# Patient Record
Sex: FEMALE | Race: WHITE | NOT HISPANIC OR LATINO | Employment: OTHER | URBAN - METROPOLITAN AREA
[De-identification: names, ages, dates, MRNs, and addresses within clinical notes are randomized per-mention and may not be internally consistent; named-entity substitution may affect disease eponyms.]

---

## 2018-05-31 ENCOUNTER — APPOINTMENT (EMERGENCY)
Dept: RADIOLOGY | Facility: HOSPITAL | Age: 83
End: 2018-05-31
Payer: COMMERCIAL

## 2018-05-31 ENCOUNTER — HOSPITAL ENCOUNTER (OUTPATIENT)
Facility: HOSPITAL | Age: 83
Setting detail: OBSERVATION
Discharge: HOME | End: 2018-06-01
Attending: EMERGENCY MEDICINE | Admitting: HOSPITALIST
Payer: COMMERCIAL

## 2018-05-31 DIAGNOSIS — R55 SYNCOPE: Primary | ICD-10-CM

## 2018-05-31 DIAGNOSIS — I10 HYPERTENSION, UNSPECIFIED TYPE: ICD-10-CM

## 2018-05-31 DIAGNOSIS — E87.6 HYPOKALEMIA: ICD-10-CM

## 2018-05-31 LAB
ALBUMIN SERPL BCP-MCNC: 3.6 G/DL (ref 3.5–5)
ALP SERPL-CCNC: 60 U/L (ref 46–116)
ALT SERPL W P-5'-P-CCNC: 31 U/L (ref 12–78)
ANION GAP SERPL CALCULATED.3IONS-SCNC: 9 MMOL/L (ref 4–13)
AST SERPL W P-5'-P-CCNC: 36 U/L (ref 5–45)
BASOPHILS # BLD AUTO: 0.05 THOUSANDS/ΜL (ref 0–0.1)
BASOPHILS NFR BLD AUTO: 1 % (ref 0–1)
BILIRUB SERPL-MCNC: 0.2 MG/DL (ref 0.2–1)
BUN SERPL-MCNC: 14 MG/DL (ref 5–25)
CALCIUM SERPL-MCNC: 8.6 MG/DL (ref 8.3–10.1)
CHLORIDE SERPL-SCNC: 108 MMOL/L (ref 100–108)
CO2 SERPL-SCNC: 24 MMOL/L (ref 21–32)
CREAT SERPL-MCNC: 0.95 MG/DL (ref 0.6–1.3)
EOSINOPHIL # BLD AUTO: 0.12 THOUSAND/ΜL (ref 0–0.61)
EOSINOPHIL NFR BLD AUTO: 2 % (ref 0–6)
ERYTHROCYTE [DISTWIDTH] IN BLOOD BY AUTOMATED COUNT: 12.3 % (ref 11.6–15.1)
GFR SERPL CREATININE-BSD FRML MDRD: 56 ML/MIN/1.73SQ M
GLUCOSE SERPL-MCNC: 124 MG/DL (ref 65–140)
HCT VFR BLD AUTO: 39.5 % (ref 34.8–46.1)
HGB BLD-MCNC: 13.2 G/DL (ref 11.5–15.4)
IMM GRANULOCYTES # BLD AUTO: 0.03 THOUSAND/UL (ref 0–0.2)
IMM GRANULOCYTES NFR BLD AUTO: 0 % (ref 0–2)
LYMPHOCYTES # BLD AUTO: 2.53 THOUSANDS/ΜL (ref 0.6–4.47)
LYMPHOCYTES NFR BLD AUTO: 35 % (ref 14–44)
MCH RBC QN AUTO: 31.7 PG (ref 26.8–34.3)
MCHC RBC AUTO-ENTMCNC: 33.4 G/DL (ref 31.4–37.4)
MCV RBC AUTO: 95 FL (ref 82–98)
MONOCYTES # BLD AUTO: 0.37 THOUSAND/ΜL (ref 0.17–1.22)
MONOCYTES NFR BLD AUTO: 5 % (ref 4–12)
NEUTROPHILS # BLD AUTO: 4.09 THOUSANDS/ΜL (ref 1.85–7.62)
NEUTS SEG NFR BLD AUTO: 57 % (ref 43–75)
NRBC BLD AUTO-RTO: 0 /100 WBCS
NT-PROBNP SERPL-MCNC: 58 PG/ML
PLATELET # BLD AUTO: 254 THOUSANDS/UL (ref 149–390)
PMV BLD AUTO: 9.8 FL (ref 8.9–12.7)
POTASSIUM SERPL-SCNC: 3.1 MMOL/L (ref 3.5–5.3)
PROT SERPL-MCNC: 6.6 G/DL (ref 6.4–8.2)
RBC # BLD AUTO: 4.16 MILLION/UL (ref 3.81–5.12)
SODIUM SERPL-SCNC: 141 MMOL/L (ref 136–145)
TROPONIN I SERPL-MCNC: <0.02 NG/ML
WBC # BLD AUTO: 7.19 THOUSAND/UL (ref 4.31–10.16)

## 2018-05-31 PROCEDURE — 83880 ASSAY OF NATRIURETIC PEPTIDE: CPT | Performed by: EMERGENCY MEDICINE

## 2018-05-31 PROCEDURE — 85025 COMPLETE CBC W/AUTO DIFF WBC: CPT | Performed by: EMERGENCY MEDICINE

## 2018-05-31 PROCEDURE — 70450 CT HEAD/BRAIN W/O DYE: CPT

## 2018-05-31 PROCEDURE — 82550 ASSAY OF CK (CPK): CPT | Performed by: HOSPITALIST

## 2018-05-31 PROCEDURE — 93005 ELECTROCARDIOGRAM TRACING: CPT

## 2018-05-31 PROCEDURE — 82553 CREATINE MB FRACTION: CPT | Performed by: HOSPITALIST

## 2018-05-31 PROCEDURE — 84484 ASSAY OF TROPONIN QUANT: CPT | Performed by: EMERGENCY MEDICINE

## 2018-05-31 PROCEDURE — 80053 COMPREHEN METABOLIC PANEL: CPT | Performed by: EMERGENCY MEDICINE

## 2018-05-31 PROCEDURE — 96374 THER/PROPH/DIAG INJ IV PUSH: CPT

## 2018-05-31 PROCEDURE — 99285 EMERGENCY DEPT VISIT HI MDM: CPT

## 2018-05-31 PROCEDURE — 36415 COLL VENOUS BLD VENIPUNCTURE: CPT | Performed by: EMERGENCY MEDICINE

## 2018-05-31 PROCEDURE — 84484 ASSAY OF TROPONIN QUANT: CPT | Performed by: HOSPITALIST

## 2018-05-31 RX ORDER — SODIUM CHLORIDE 9 MG/ML
75 INJECTION, SOLUTION INTRAVENOUS CONTINUOUS
Status: DISCONTINUED | OUTPATIENT
Start: 2018-05-31 | End: 2018-06-01

## 2018-05-31 RX ORDER — POTASSIUM CHLORIDE 14.9 MG/ML
20 INJECTION INTRAVENOUS
Status: COMPLETED | OUTPATIENT
Start: 2018-05-31 | End: 2018-06-01

## 2018-05-31 RX ADMIN — POTASSIUM CHLORIDE 20 MEQ: 200 INJECTION, SOLUTION INTRAVENOUS at 20:53

## 2018-05-31 RX ADMIN — SODIUM CHLORIDE 75 ML/HR: 0.9 INJECTION, SOLUTION INTRAVENOUS at 23:35

## 2018-05-31 RX ADMIN — POTASSIUM CHLORIDE 20 MEQ: 200 INJECTION, SOLUTION INTRAVENOUS at 22:52

## 2018-05-31 NOTE — ED PROVIDER NOTES
History  Chief Complaint   Patient presents with    Syncope     Pt had syncopal episode (in chair), unresponsive x 10 minutes  Vomit x 1  Pt  c/o general weakness, denies other complaints  21-year-old female no known past medical history, no medications presents to the emergency department after a syncopal/near syncopal episode today  Patient was sitting at a bar/restaurant had not been drinking when she suddenly slumped forward into her chair and had slight tremors in her bilateral upper extremities, patient was not responsive for approximately 10 minutes however she was purposely apparently me using her extremities towards goals and grabbing at other objects  After the episode patient returned to baseline however had a few episodes of nonbloody nonbilious vomiting  This has never happened before  Patient previously has had near syncopal episodes which were orthostatic in nature due to being out in the sun and dehydrated  Patient had been eating and drinking normally throughout the day was not dehydrated felt otherwise fine  She denies any chest pain or shortness of breath denies any abdominal pain, constipation or diarrhea and she feels otherwise fine at this time  History provided by:  Patient and relative   used: No    Syncope   Episode history:  Single  Most recent episode: Today  Timing:  Constant  Progression:  Resolved  Chronicity:  New  Context: sitting down    Witnessed: yes    Relieved by:  Nothing  Worsened by:  Nothing  Ineffective treatments:  None tried  Associated symptoms: nausea and vomiting    Associated symptoms: no chest pain, no fever and no shortness of breath        None       History reviewed  No pertinent past medical history  Past Surgical History:   Procedure Laterality Date    HYSTERECTOMY         History reviewed  No pertinent family history  I have reviewed and agree with the history as documented      Social History   Substance Use Topics  Smoking status: Never Smoker    Smokeless tobacco: Never Used    Alcohol use No        Review of Systems   Constitutional: Negative for chills and fever  HENT: Negative for sore throat  Eyes: Negative for visual disturbance  Respiratory: Negative for chest tightness, shortness of breath and wheezing  Cardiovascular: Positive for syncope  Negative for chest pain  Gastrointestinal: Positive for nausea and vomiting  Negative for abdominal pain, blood in stool, constipation and diarrhea  Genitourinary: Negative for dysuria and hematuria  Musculoskeletal: Negative for arthralgias and myalgias  Skin: Negative for color change  Neurological: Positive for tremors and syncope  Negative for light-headedness  Hematological: Negative for adenopathy  Psychiatric/Behavioral: Negative for agitation and behavioral problems  All other systems reviewed and are negative  Physical Exam  ED Triage Vitals [05/31/18 1930]   Temperature Pulse Respirations Blood Pressure SpO2   97 9 °F (36 6 °C) 61 18 (!) 175/78 96 %      Temp Source Heart Rate Source Patient Position - Orthostatic VS BP Location FiO2 (%)   Oral Monitor Sitting Right arm --      Pain Score       No Pain           Orthostatic Vital Signs  Vitals:    06/01/18 0255 06/01/18 0300 06/01/18 0400 06/01/18 0715   BP: (!) 214/96 (!) 192/100 (!) 192/87 170/82   Pulse:  70  78   Patient Position - Orthostatic VS: Lying Lying  Sitting       Physical Exam   Constitutional: She is oriented to person, place, and time  She appears well-developed and well-nourished  No distress  HENT:   Head: Normocephalic and atraumatic  Eyes: Conjunctivae and EOM are normal  No scleral icterus  Neck: Normal range of motion  Neck supple  Cardiovascular: Normal rate, regular rhythm and normal heart sounds  No murmur heard  Pulmonary/Chest: Effort normal and breath sounds normal  No respiratory distress  Abdominal: Soft   Bowel sounds are normal  There is no tenderness  Musculoskeletal: Normal range of motion  Neurological: She is alert and oriented to person, place, and time  She has normal strength  She is not disoriented  No cranial nerve deficit or sensory deficit  Coordination normal    Skin: Skin is warm and dry  Psychiatric: She has a normal mood and affect  Her behavior is normal    Nursing note and vitals reviewed  ED Medications  Medications   sodium chloride 0 9 % infusion (0 mL/hr Intravenous Stopped 6/1/18 0057)   ondansetron (ZOFRAN) injection 4 mg (not administered)   enoxaparin (LOVENOX) subcutaneous injection 40 mg (40 mg Subcutaneous Given 6/1/18 4422)   hydrALAZINE (APRESOLINE) injection 5 mg (5 mg Intravenous Given 6/1/18 5208)   potassium chloride 20 mEq IVPB (premix) (20 mEq Intravenous New Bag 5/31/18 1427)       Diagnostic Studies  Results Reviewed     Procedure Component Value Units Date/Time    Troponin I [[id number]]  (Normal) Collected:  05/31/18 2009    Lab Status:  Final result Specimen:  Blood from Arm, Left Updated:  05/31/18 2042     Troponin I <0 02 ng/mL     Narrative:         [employer] Chemistry analyzer 99% cutoff is > 0 04 ng/mL in network labs    o cTnI 99% cutoff is useful only when applied to patients in the clinical setting of myocardial ischemia  o cTnI 99% cutoff should be interpreted in the context of clinical history, ECG findings and possibly cardiac imaging to establish correct diagnosis  o cTnI 99% cutoff may be suggestive but clearly not indicative of a coronary event without the clinical setting of myocardial ischemia      Comprehensive metabolic panel [[id number]]  (Abnormal) Collected:  05/31/18 2009    Lab Status:  Final result Specimen:  Blood from Hand, Left Updated:  05/31/18 2033     Sodium 141 mmol/L      Potassium 3 1 (L) mmol/L      Chloride 108 mmol/L      CO2 24 mmol/L      Anion Gap 9 mmol/L      BUN 14 mg/dL      Creatinine 0 95 mg/dL      Glucose 124 mg/dL      Calcium 8 6 mg/dL      AST 36 U/L ALT 31 U/L      Alkaline Phosphatase 60 U/L      Total Protein 6 6 g/dL      Albumin 3 6 g/dL      Total Bilirubin 0 20 mg/dL      eGFR 56 ml/min/1 73sq m     Narrative:         National Kidney Disease Education Program recommendations are as follows:  GFR calculation is accurate only with a steady state creatinine  Chronic Kidney disease less than 60 ml/min/1 73 sq  meters  Kidney failure less than 15 ml/min/1 73 sq  meters  B-type natriuretic peptide [[id number]]  (Normal) Collected:  05/31/18 2009    Lab Status:  Final result Specimen:  Blood from Hand, Left Updated:  05/31/18 2033     NT-proBNP 58 pg/mL     CBC and differential [[id number]] Collected:  05/31/18 2009    Lab Status:  Final result Specimen:  Blood from Hand, Left Updated:  05/31/18 2016     WBC 7 19 Thousand/uL      RBC 4 16 Million/uL      Hemoglobin 13 2 g/dL      Hematocrit 39 5 %      MCV 95 fL      MCH 31 7 pg      MCHC 33 4 g/dL      RDW 12 3 %      MPV 9 8 fL      Platelets 549 Thousands/uL      nRBC 0 /100 WBCs      Neutrophils Relative 57 %      Immat GRANS % 0 %      Lymphocytes Relative 35 %      Monocytes Relative 5 %      Eosinophils Relative 2 %      Basophils Relative 1 %      Neutrophils Absolute 4 09 Thousands/µL      Immature Grans Absolute 0 03 Thousand/uL      Lymphocytes Absolute 2 53 Thousands/µL      Monocytes Absolute 0 37 Thousand/µL      Eosinophils Absolute 0 12 Thousand/µL      Basophils Absolute 0 05 Thousands/µL                  CT head without contrast   Final Result by James Fong MD (05/31 2127)      No acute intracranial abnormality                    Workstation performed: [id number]               Procedures  ECG 12 Lead Documentation  Date/Time: 5/31/2018 7:50 PM  Performed by: Lachelle Lanier by: Fela Dalton     ECG reviewed by me, the ED Provider: yes    Patient location:  ED  Previous ECG:     Previous ECG:  Unavailable    Comparison to cardiac monitor: Yes    Interpretation: Interpretation: normal    Rate:     ECG rate assessment: normal    Rhythm:     Rhythm: sinus rhythm    Ectopy:     Ectopy: none    QRS:     QRS axis:  Normal  Conduction:     Conduction: normal    ST segments:     ST segments:  Normal  T waves:     T waves: normal            Phone Consults  ED Phone Contact    ED Course  ED Course as of Jun 01 1117   Thu May 31, 2018   2042 Potassium: (!) 3 1                               [facility]  Number of Diagnoses or Management Options  Hypokalemia: new and requires workup  Syncope: new and requires workup  Diagnosis management comments: 61-year-old female presenting to the emergency department after syncopal episode, will order cardiac laboratories as well as EKG, CT head  Patient found to be hypokalemic to 3 1, will give 40 mg IV potassium  Will admit patient to [facility] for syncope and further workup  Amount and/or Complexity of Data Reviewed  Clinical lab tests: ordered and reviewed  Tests in the radiology section of CPT®: ordered and reviewed  Tests in the medicine section of CPT®: ordered and reviewed  Obtain history from someone other than the patient: yes  Review and summarize past medical records: yes  Discuss the patient with other providers: yes      CritCare Time    Disposition  Final diagnoses:   Syncope   Hypokalemia     Time reflects when diagnosis was documented in both MDM as applicable and the Disposition within this note     Time User Action Codes Description Comment    5/31/2018  9:08 PM Tammi Fernandez Add [R55] Syncope     5/31/2018  9:08 PM Tammi Davidl Add [E87 6] Hypokalemia       ED Disposition     ED Disposition Condition Comment    Admit  Case was discussed with [facility] and the patient's admission status was agreed to be Admission Status: observation status to the service of Dr Blanca Sanchez   Follow-up Information    None         There are no discharge medications for this patient  No discharge procedures on file      ED Provider  Attending physically available and evaluated Mandeep Ricardo I managed the patient along with the ED Attending      Electronically Signed by         Braeden Guerrero MD  06/01/18 7618

## 2018-06-01 ENCOUNTER — APPOINTMENT (OUTPATIENT)
Dept: NON INVASIVE DIAGNOSTICS | Facility: HOSPITAL | Age: 83
End: 2018-06-01
Payer: COMMERCIAL

## 2018-06-01 VITALS
HEIGHT: 64 IN | BODY MASS INDEX: 24.75 KG/M2 | WEIGHT: 145 LBS | SYSTOLIC BLOOD PRESSURE: 142 MMHG | HEART RATE: 71 BPM | DIASTOLIC BLOOD PRESSURE: 81 MMHG | RESPIRATION RATE: 16 BRPM | TEMPERATURE: 98.8 F | OXYGEN SATURATION: 99 %

## 2018-06-01 PROBLEM — E87.6 HYPOKALEMIA: Status: ACTIVE | Noted: 2018-06-01

## 2018-06-01 PROBLEM — I16.0 HYPERTENSIVE URGENCY: Status: ACTIVE | Noted: 2018-06-01

## 2018-06-01 PROBLEM — R55 SYNCOPE: Status: ACTIVE | Noted: 2018-06-01

## 2018-06-01 LAB
ANION GAP SERPL CALCULATED.3IONS-SCNC: 7 MMOL/L (ref 4–13)
ATRIAL RATE: 61 BPM
BUN SERPL-MCNC: 13 MG/DL (ref 5–25)
CALCIUM SERPL-MCNC: 8 MG/DL (ref 8.3–10.1)
CHLORIDE SERPL-SCNC: 110 MMOL/L (ref 100–108)
CK MB SERPL-MCNC: 1.7 % (ref 0–2.5)
CK MB SERPL-MCNC: 6.4 NG/ML (ref 0–5)
CK SERPL-CCNC: 386 U/L (ref 26–192)
CO2 SERPL-SCNC: 26 MMOL/L (ref 21–32)
CREAT SERPL-MCNC: 0.89 MG/DL (ref 0.6–1.3)
GFR SERPL CREATININE-BSD FRML MDRD: 61 ML/MIN/1.73SQ M
GLUCOSE SERPL-MCNC: 97 MG/DL (ref 65–140)
P AXIS: 69 DEGREES
POTASSIUM SERPL-SCNC: 3.8 MMOL/L (ref 3.5–5.3)
PR INTERVAL: 152 MS
QRS AXIS: 44 DEGREES
QRSD INTERVAL: 86 MS
QT INTERVAL: 420 MS
QTC INTERVAL: 422 MS
SODIUM SERPL-SCNC: 143 MMOL/L (ref 136–145)
T WAVE AXIS: 39 DEGREES
TROPONIN I SERPL-MCNC: <0.02 NG/ML
TROPONIN I SERPL-MCNC: <0.02 NG/ML
VENTRICULAR RATE: 61 BPM

## 2018-06-01 PROCEDURE — 84484 ASSAY OF TROPONIN QUANT: CPT | Performed by: HOSPITALIST

## 2018-06-01 PROCEDURE — 80048 BASIC METABOLIC PNL TOTAL CA: CPT | Performed by: HOSPITALIST

## 2018-06-01 PROCEDURE — 93306 TTE W/DOPPLER COMPLETE: CPT

## 2018-06-01 PROCEDURE — 99220 PR INITIAL OBSERVATION CARE/DAY 70 MINUTES: CPT | Performed by: HOSPITALIST

## 2018-06-01 PROCEDURE — 93010 ELECTROCARDIOGRAM REPORT: CPT | Performed by: INTERNAL MEDICINE

## 2018-06-01 PROCEDURE — 93306 TTE W/DOPPLER COMPLETE: CPT | Performed by: INTERNAL MEDICINE

## 2018-06-01 PROCEDURE — 99217 PR OBSERVATION CARE DISCHARGE MANAGEMENT: CPT | Performed by: NURSE PRACTITIONER

## 2018-06-01 RX ORDER — AMLODIPINE BESYLATE 5 MG/1
5 TABLET ORAL DAILY
Status: DISCONTINUED | OUTPATIENT
Start: 2018-06-01 | End: 2018-06-01

## 2018-06-01 RX ORDER — AMLODIPINE BESYLATE 5 MG/1
5 TABLET ORAL DAILY
Qty: 30 TABLET | Refills: 0 | Status: SHIPPED | OUTPATIENT
Start: 2018-06-02

## 2018-06-01 RX ORDER — AMLODIPINE BESYLATE 5 MG/1
5 TABLET ORAL DAILY
Status: DISCONTINUED | OUTPATIENT
Start: 2018-06-01 | End: 2018-06-01 | Stop reason: HOSPADM

## 2018-06-01 RX ORDER — HYDRALAZINE HYDROCHLORIDE 20 MG/ML
5 INJECTION INTRAMUSCULAR; INTRAVENOUS EVERY 6 HOURS PRN
Status: DISCONTINUED | OUTPATIENT
Start: 2018-06-01 | End: 2018-06-01

## 2018-06-01 RX ORDER — SODIUM CHLORIDE 9 MG/ML
125 INJECTION, SOLUTION INTRAVENOUS CONTINUOUS
Status: DISCONTINUED | OUTPATIENT
Start: 2018-06-01 | End: 2018-06-01

## 2018-06-01 RX ORDER — ONDANSETRON 2 MG/ML
4 INJECTION INTRAMUSCULAR; INTRAVENOUS EVERY 6 HOURS PRN
Status: DISCONTINUED | OUTPATIENT
Start: 2018-06-01 | End: 2018-06-01 | Stop reason: HOSPADM

## 2018-06-01 RX ADMIN — HYDRALAZINE HYDROCHLORIDE 5 MG: 20 INJECTION INTRAMUSCULAR; INTRAVENOUS at 04:12

## 2018-06-01 RX ADMIN — AMLODIPINE BESYLATE 5 MG: 5 TABLET ORAL at 14:10

## 2018-06-01 RX ADMIN — ENOXAPARIN SODIUM 40 MG: 40 INJECTION SUBCUTANEOUS at 09:36

## 2018-06-01 NOTE — ED ATTENDING ATTESTATION
I, Taylor Aden DO, saw and evaluated the patient  I have discussed the patient with the resident/non-physician practitioner and agree with the resident's/non-physician practitioner's findings, Plan of Care, and MDM as documented in the resident's/non-physician practitioner's note, except where noted  All available labs and Radiology studies were reviewed  At this point I agree with the current assessment done in the Emergency Department  I have conducted an independent evaluation of this patient a history and physical is as follows:      Critical Care Time  CritCare Time    Procedures     81 yr old fem started to not feel well at the end of dinner  She became nauseated, pale and clammy  She slumped forward  Vomited  Unresponsive but did not lose tone  Decr responsiveness for about 10 min  No chest pain, sob, headache, neck pain, new meds, arrythmia sensation, neck pain, abd pain, diarrhea, prior hx of the same  exm: EKG: NSR wo acute changes  Abd: soft and nontender  No leg pain or swelling  Alert and oriented  Pln: syncope smith

## 2018-06-01 NOTE — PLAN OF CARE
Problem: PAIN - ADULT  Goal: Verbalizes/displays adequate comfort level or baseline comfort level  Interventions:  - Encourage patient to monitor pain and request assistance  - Assess pain using appropriate pain scale  - Administer analgesics based on type and severity of pain and evaluate response  - Implement non-pharmacological measures as appropriate and evaluate response  - Consider cultural and social influences on pain and pain management  - Notify physician/advanced practitioner if interventions unsuccessful or patient reports new pain   Outcome: Progressing      Problem: SAFETY ADULT  Goal: Patient will remain free of falls  INTERVENTIONS:  - Assess patient frequently for physical needs  -  Identify cognitive and physical deficits and behaviors that affect risk of falls    -  [place] fall precautions as indicated by assessment   - Educate patient/family on patient safety including physical limitations  - Instruct patient to call for assistance with activity based on assessment  - Modify environment to reduce risk of injury  - Consider OT/PT consult to assist with strengthening/mobility   Outcome: Progressing    Goal: Maintain or return mobility status to optimal level  INTERVENTIONS:  - Assess patient's baseline mobility status (ambulation, transfers, stairs, etc )    - Identify cognitive and physical deficits and behaviors that affect mobility  - Identify mobility aids required to assist with transfers and/or ambulation (gait belt, sit-to-stand, lift, walker, cane, etc )  - [place] fall precautions as indicated by assessment  - Record patient progress and toleration of activity level on Mobility SBAR; progress patient to next Phase/Stage  - Instruct patient to call for assistance with activity based on assessment  - Request Rehabilitation consult to assist with strengthening/weightbearing, etc    Outcome: Progressing

## 2018-06-01 NOTE — CASE MANAGEMENT
Thank you,  [facility] in the [facility] by Chris Zepeda for 2017  Network Utilization Review Department  Phone: [phone]; Fax [phone]  ATTENTION: The Network Utilization Review Department is now centralized for our 7 Facilities  Make a note that we have a new phone and fax numbers for our Department  Please call with any questions or concerns to [phone] and carefully follow the prompts so that you are directed to the right person  All voicemails are confidential  Fax any determinations, approvals, denials, and requests for initial or continue stay review clinical to [phone]   Due to HIGH CALL volume, it would be easier if you could please send faxed requests to expedite your requests and in part, help us provide discharge notifications faster     =====================================================================    Continued Stay Review    Date: 06/01/2018    Vital Signs: /82 (BP Location: Right arm)   Pulse 78   Temp 98 5 °F (36 9 °C) (Oral)   Resp 18   Ht 5' 4" (1 626 m)   Wt 65 8 kg (145 lb)   SpO2 100%   BMI 24 89 kg/m²     Medications:   Scheduled Meds:   Current Facility-Administered Medications:  enoxaparin 40 mg Subcutaneous Daily    hydrALAZINE 5 mg Intravenous Q6H PRN    ondansetron 4 mg Intravenous Q6H PRN    sodium chloride 75 mL/hr Intravenous Continuous Last Rate: Stopped (06/01/18 0057)     Continuous Infusions:   sodium chloride 75 mL/hr Last Rate: Stopped (06/01/18 0057)     Abnormal Labs/Diagnostic Results:     Age/Sex: 81 y o  female     Assessment/Plan: 0920,    Discharge Plan:   06/01/18 1530  Discharge patient Once     Discharge Disposition: Home/Self Care    Expected Discharge Date: 06/01/18

## 2018-06-01 NOTE — H&P
H&P- Sharyn Flores 1935, 81 y o  female MRN: [id number]    Unit/Bed#: 2 210-01 Encounter: [id number]    Primary Care Provider: No primary care provider on file  Date and time admitted to hospital: 5/31/2018  7:23 PM        * Syncope   Assessment & Plan    Assigned observation status for further workup and evaluation  Suspect vasovagal  Check serial cardiac markers  Monitor on telemetry to rule out arrhythmia  Check orthostatics  Gentle hydration        Hypokalemia   Assessment & Plan    Replete intravenously  Repeat BMP in the a m  VTE Prophylaxis: Enoxaparin (Lovenox)  / sequential compression device   Code Status:  Full  POLST: There is no POLST form on file for this patient (pre-hospital)  Discussion with family:  Patient    Anticipated Length of Stay:  Patient will be admitted on an Observation basis with an anticipated length of stay of  less than 2 midnights  Justification for Hospital Stay:  Observed overnight for arrhythmia, rule out ACS    Total Time for Visit, including Counseling / Coordination of Care: 45 minutes  Greater than 50% of this total time spent on direct patient counseling and coordination of care  Chief Complaint:   Syncope    History of Present Illness:    Sharyn Flores is a 81 y o  female with no significant past medical history presents to emergency department after syncopal event  Patient reports that earlier this evening when she had several family members at her home, later they went out for dinner  While at the restaurant patient reports that she became diaphoretic, felt nauseous, had an episode of emesis followed by fainting spell  She denies chest pain, lightheadedness or palpitations prior to the event  She denies loss of urine or stool  There was concern that patient had upper extremity tremors with event  She was brought to the ER for further evaluation    Review of Systems:    Review of Systems  No fevers or chills  No chest pain, shortness of breath, lightheadedness or palpitations  No change in urine or bowel habits  Remainder of 12 system review is negative unless mentioned in HPI  Past Medical and Surgical History:     History reviewed  No pertinent past medical history  Past Surgical History:   Procedure Laterality Date    HYSTERECTOMY         Meds/Allergies:    Prior to Admission medications    Not on File     I have reviewed home medications with patient personally  Allergies: Allergies   Allergen Reactions    Tetanus Toxoids Fatigue       Social History:     Marital Status: /Civil Union   Patient Pre-hospital Living Situation:  Independent  Patient Pre-hospital Level of Mobility:  Independent  Patient Pre-hospital Diet Restrictions:  None  Substance Use History:   History   Alcohol Use No     History   Smoking Status    Never Smoker   Smokeless Tobacco    Never Used     History   Drug Use No       Family History:    non-contributory    Physical Exam:     Vitals:   Blood Pressure: (!) 175/79 (06/01/18 0000)  Pulse: 86 (05/31/18 2215)  Temperature: 97 8 °F (36 6 °C) (05/31/18 2215)  Temp Source: Oral (05/31/18 2215)  Respirations: 18 (05/31/18 2215)  Height: 5' 4" (162 6 cm) (05/31/18 2215)  Weight - Scale: 65 8 kg (145 lb) (05/31/18 2215)  SpO2: 99 % (05/31/18 2215)    Physical Exam    No distress, awake and alert  Sclera anicteric  Moist mucous membranes  Regular rate rhythm  Clear bilaterally  Soft, normal bowel sounds, nontender, nondistended  No peripheral edema  Oriented x3, nonfocal neurologic exam  Calm and cooperative  Distal pulses intact  Skin warm dry intact    Additional Data:     Lab Results: I have personally reviewed pertinent reports          Results from last 7 days  Lab Units 05/31/18 2009   WBC Thousand/uL 7 19   HEMOGLOBIN g/dL 13 2   HEMATOCRIT % 39 5   PLATELETS Thousands/uL 254   NEUTROS PCT % 57   LYMPHS PCT % 35   MONOS PCT % 5   EOS PCT % 2       Results from last 7 days  Lab Units 05/31/18 2009 SODIUM mmol/L 141   POTASSIUM mmol/L 3 1*   CHLORIDE mmol/L 108   CO2 mmol/L 24   BUN mg/dL 14   CREATININE mg/dL 0 95   CALCIUM mg/dL 8 6   TOTAL PROTEIN g/dL 6 6   BILIRUBIN TOTAL mg/dL 0 20   ALK PHOS U/L 60   ALT U/L 31   AST U/L 36   GLUCOSE RANDOM mg/dL 124                   Imaging: I have personally reviewed pertinent reports  CT head without contrast   Final Result by Jesika Mon MD (05/31 2127)      No acute intracranial abnormality  Workstation performed: [id number]             EKG, Pathology, and Other Studies Reviewed on Admission:   · EKG:  Normal sinus rhythm, no ischemic changes    Allscripts / Epic Records Reviewed: Yes     ** Please Note: This note has been constructed using a voice recognition system   **

## 2018-06-01 NOTE — DISCHARGE INSTRUCTIONS
Suspect your near fainting episode is likely secondary to uncontrolled high blood pressure  You were started on a medication called Norvasc which we encourage you to take every day at the same time if possible  Please check your blood pressure daily  preferr prior to taking the blood pressure medication  Goal blood pressure is around 120/80  If your blood pressure remains above this goal number especially above 160/80 on a consistent basis please call your family doctor  Monitor your blood pressure daily  Watch her salt intake would encourage a no added salt diet  Please discuss this further with your family doctor    We recommend you see your family doctor within 5-7 days after hospital discharge  Hypertension   WHAT YOU NEED TO KNOW:   Hypertension is high blood pressure (BP)  Your BP is the force of your blood moving against the walls of your arteries  Normal BP is less than 120/80  Prehypertension is between 120/80 and 139/89  Hypertension is 140/90 or higher  Hypertension causes your BP to get so high that your heart has to work much harder than normal  This can damage your heart  You can control hypertension with a healthy lifestyle or medicines  A controlled blood pressure helps protect your organs, such as your heart, lungs, brain, and kidneys  DISCHARGE INSTRUCTIONS:   Call 911 for any of the following:   · You have discomfort in your chest that feels like squeezing, pressure, fullness, or pain  · You become confused or have difficulty speaking  · You suddenly feel lightheaded or have trouble breathing  · You have pain or discomfort in your back, neck, jaw, stomach, or arm  Seek care immediately if:   · You have a severe headache or vision loss  · You have weakness in an arm or leg  Contact your healthcare provider if:   · You feel faint, dizzy, confused, or drowsy      · You have been taking your BP medicine and your BP is still higher than your healthcare provider says it should be  · You have questions or concerns about your condition or care  Medicines: You may  need any of the following:  · Medicine  may be used to help lower your BP  You may need more than one type of medicine  Take the medicine exactly as directed  · Diuretics  help decrease extra fluid that collects in your body  This will help lower your BP  You may urinate more often while you take this medicine  · Cholesterol medicine  helps lower your cholesterol level  A low cholesterol level helps prevent heart disease and makes it easier to control your blood pressure  · Take your medicine as directed  Contact your healthcare provider if you think your medicine is not helping or if you have side effects  Tell him or her if you are allergic to any medicine  Keep a list of the medicines, vitamins, and herbs you take  Include the amounts, and when and why you take them  Bring the list or the pill bottles to follow-up visits  Carry your medicine list with you in case of an emergency  Follow up with your healthcare provider as directed: You will need to return to have your BP checked and to have other lab tests done  Write down your questions so you remember to ask them during your visits  Manage hypertension:  Talk with your healthcare provider about these and other ways to manage hypertension:  · Check your BP at home  Sit and rest for 5 minutes before you take your BP  Extend your arm and support it on a flat surface  Your arm should be at the same level as your heart  Follow the directions that came with your BP monitor  If possible, take at least 2 BP readings each time  Take your BP at least twice a day at the same times each day, such as morning and evening  Keep a record of your BP readings and bring it to your follow-up visits  Ask your healthcare provider what your BP should be  · Limit sodium (salt) as directed  Too much sodium can affect your fluid balance   Check labels to find low-sodium or no-salt-added foods  Some low-sodium foods use potassium salts for flavor  Too much potassium can also cause health problems  Your healthcare provider will tell you how much sodium and potassium are safe for you to have in a day  He or she may recommend that you limit sodium to 2,300 mg a day  · Follow the meal plan recommended by your healthcare provider  A dietitian or your provider can give you more information on low-sodium plans or the DASH (Dietary Approaches to Stop Hypertension) eating plan  The DASH plan is low in sodium, unhealthy fats, and total fat  It is high in potassium, calcium, and fiber  · Exercise to maintain a healthy weight  Exercise at least 30 minutes per day, on most days of the week  This will help decrease your blood pressure  Ask your healthcare provider about the best exercise plan for you  · Decrease stress  This may help lower your BP  Learn ways to relax, such as deep breathing or listening to music  · Limit alcohol  Women should limit alcohol to 1 drink a day  Men should limit alcohol to 2 drinks a day  A drink of alcohol is 12 ounces of beer, 5 ounces of wine, or 1½ ounces of liquor  · Do not smoke  Nicotine and other chemicals in cigarettes and cigars can increase your BP and also cause lung damage  Ask your healthcare provider for information if you currently smoke and need help to quit  E-cigarettes or smokeless tobacco still contain nicotine  Talk to your healthcare provider before you use these products  · Manage any other health conditions you have  Health conditions such as diabetes can increase your risk for hypertension  Follow your healthcare provider's instructions and take all your medicines as directed  © 2017 [employer] Information is for End User's use only and may not be sold, redistributed or otherwise used for commercial purposes   All illustrations and images included in CareNotes® are the copyrighted property of [employer]  or Chris Zepeda  The above information is an  only  It is not intended as medical advice for individual conditions or treatments  Talk to your doctor, nurse or pharmacist before following any medical regimen to see if it is safe and effective for you

## 2018-06-01 NOTE — ASSESSMENT & PLAN NOTE
· Patient states her blood pressures been high in the past and when she goes to her PCPs office she often has to sit for quite awhile with for her blood pressure is able to be taken  Sounds like she might have white coat syndrome  Her blood pressures been elevated since admission and did respond to a dose of hydralazine  · Started on Norvasc 5 mg daily  Her blood pressure did respond after taking this medication  · Advised that she check her blood pressure daily  Recommended goal at this time would be 120/80 as she does not have any significant medical history  · Advise she follow-up with her PCP within 5-7 days after hospital discharge  · She states she has a blood pressure machine at home and can monitor her blood pressure on a daily basis

## 2018-06-01 NOTE — ASSESSMENT & PLAN NOTE
Assigned observation status for further workup and evaluation  Suspect vasovagal  Check serial cardiac markers  Monitor on telemetry to rule out arrhythmia  Check orthostatics  Gentle hydration

## 2018-06-01 NOTE — CASE MANAGEMENT
Thank you,  [facility] in the [facility] by Chris Zepeda for 2017  Network Utilization Review Department  Phone: [phone]; Fax [phone]  ATTENTION: The Network Utilization Review Department is now centralized for our 7 Facilities  Make a note that we have a new phone and fax numbers for our Department  Please call with any questions or concerns to [phone] and carefully follow the prompts so that you are directed to the right person  All voicemails are confidential  Fax any determinations, approvals, denials, and requests for initial or continue stay review clinical to [phone]  Due to HIGH CALL volume, it would be easier if you could please send faxed requests to expedite your requests and in part, help us provide discharge notifications faster     =================================================================    Initial Clinical Review    Admission: Date/Time/Statement: 05/31/2018 @ 2108  Orders Placed This Encounter   Procedures    Place in Observation (expected length of stay for this patient is less than two midnights)     Standing Status:   Standing     Number of Occurrences:   1     Order Specific Question:   Admitting Physician     Answer:   Yann Mojica [[id number]]     Order Specific Question:   Level of Care     Answer:   Med Surg [16]         ED: Date/Time/Mode of Arrival:   ED Arrival Information     Expected Arrival Acuity Means of Arrival Escorted By Service Admission Type    - 5/31/2018 19:18 Urgent Walk-In Family Member General Medicine Urgent    Arrival Complaint    syncope          Chief Complaint:   Chief Complaint   Patient presents with    Syncope     Pt had syncopal episode (in chair), unresponsive x 10 minutes  Vomit x 1  Pt  c/o general weakness, denies other complaints           History of Illness:   Irwin Maldonado is a 81 y o  female with no significant past medical history presents to emergency department after syncopal event  Patient reports that earlier this evening when she had several family members at her home, later they went out for dinner  While at the restaurant patient reports that she became diaphoretic, felt nauseous, had an episode of emesis followed by fainting spell  There was concern that patient had upper extremity tremors with event  She was brought to the ER for further evaluation  ED Vital Signs:   ED Triage Vitals [05/31/18 1930]   Temperature Pulse Respirations Blood Pressure SpO2   97 9 °F (36 6 °C) 61 18 (!) 175/78 96 %      Temp Source Heart Rate Source Patient Position - Orthostatic VS BP Location FiO2 (%)   Oral Monitor Sitting Right arm --      Pain Score       No Pain        Wt Readings from Last 1 Encounters:   05/31/18 65 8 kg (145 lb)       Abnormal Labs/Diagnostic Test Results:   WBC Thousand/uL 7 19   HEMOGLOBIN g/dL 13 2   HEMATOCRIT % 39 5   PLATELETS Thousands/uL 254     SODIUM mmol/L 141   POTASSIUM mmol/L 3 1*   CHLORIDE mmol/L 108   CO2 mmol/L 24   BUN mg/dL 14   CREATININE mg/dL 0 95   CALCIUM mg/dL 8 6   TOTAL PROTEIN g/dL 6 6   BILIRUBIN TOTAL mg/dL 0 20   ALK PHOS U/L 60   ALT U/L 31   AST U/L 36   GLUCOSE RANDOM mg/dL 124     Troponin I <0 02 ng/mL     NT-proBNP 58 pg/mL     ECG:  NSR    CT Head: No acute intracranial abnormality         ED Treatment:   Medication Administration from 05/31/2018 1918 to 05/31/2018 2156       Date/Time Order Dose Route Action Action by Comments     05/31/2018 2053 potassium chloride 20 mEq IVPB (premix) 20 mEq Intravenous New Bag Aayush Bledsoe RN           Past Medical/Surgical History:   No Additional Past Medical History       Admitting Diagnosis: Hypokalemia [E87 6]  Syncope [R55]    Age/Sex: 81 y o  female    Assessment/Plan:   * Syncope   Assessment & Plan     Assigned observation status for further workup and evaluation  Suspect vasovagal  Check serial cardiac markers  Monitor on telemetry to rule out arrhythmia  Check orthostatics  Gentle hydration          Hypokalemia   Assessment & Plan     Replete intravenously  Repeat BMP in the a m              VTE Prophylaxis: Enoxaparin (Lovenox)  / sequential compression device      Anticipated Length of Stay:  Patient will be admitted on an Observation basis with an anticipated length of stay of  less than 2 midnights     Justification for Hospital Stay:  Observed overnight for arrhythmia, rule out ACS    Admission Orders:  Scheduled Meds:   Current Facility-Administered Medications:  enoxaparin 40 mg Subcutaneous Daily Forest Vences MD    hydrALAZINE 5 mg Intravenous Q6H PRN Karlie Tran PA-C    ondansetron 4 mg Intravenous Q6H PRN Forest Vences MD    sodium chloride 75 mL/hr Intravenous Continuous Forest Vences MD Last Rate: Stopped (06/01/18 0057)     Continuous Infusions:   sodium chloride 75 mL/hr Last Rate: Stopped (06/01/18 0057)     TELM  ECHO: pend

## 2018-06-01 NOTE — DISCHARGE SUMMARY
Discharge- Campbell Rodriguez 1935, 81 y o  female MRN: [id number]    Unit/Bed#: CW2 210-01 Encounter: [id number]    Primary Care Provider: No primary care provider on file  Date and time admitted to hospital: 5/31/2018  7:23 PM        Hypertensive urgency   Assessment & Plan    · Patient states her blood pressures been high in the past and when she goes to her PCPs office she often has to sit for quite awhile with for her blood pressure is able to be taken  Sounds like she might have white coat syndrome  Her blood pressures been elevated since admission and did respond to a dose of hydralazine  · Started on Norvasc 5 mg daily  Her blood pressure did respond after taking this medication  · Advised that she check her blood pressure daily  Recommended goal at this time would be 120/80 as she does not have any significant medical history  · Advise she follow-up with her PCP within 5-7 days after hospital discharge  · She states she has a blood pressure machine at home and can monitor her blood pressure on a daily basis  Hypokalemia   Assessment & Plan    Resolved  BMP stable  Outpatient follow-up with PCP          * Syncope   Assessment & Plan    Patient experienced a near syncopal episode suspected secondary to hypertensive urgency/vasovagal  EKG without acute changes no signs of ischemia  Troponin negative x3  Telemetry no ectopy noted normal sinus rhythm on the monitor  CK elevated at 386 with a slight elevation of the CK-MB fraction of 6 4 suspect this is secondary to the hypertensive urgency  Echocardiogram EF 60%, no regional wall abnormalities, grade 1 diastolic dysfunction  Mitral valve mild regurgitation  Orthostatics negative  Patient tolerating activity without any further episodes  She feels well and at her baseline  Considered medically stable for discharge home  Discharging Physician / Practitioner: KENAN Schmitz  PCP: No primary care provider on file    Admission Date:   Admission Orders     Ordered        05/31/18 2108  Place in Observation (expected length of stay for this patient is less than two midnights)  Once             Discharge Date: 06/01/18    Resolved Problems  Date Reviewed: 6/1/2018    None          Consultations During Hospital Stay:  · none    Procedures Performed:     Echo: SUMMARY LEFT VENTRICLE:  Systolic function was normal  Ejection fraction was estimated to be 60 %  There were no regional wall motion abnormalities  Wall thickness was mildly increased  There was moderate assymetrical hypertrophy involving focal basal segment of the septum  There was no dynamic obstruction  Doppler parameters were consistent with abnormal left ventricular relaxation (grade 1 diastolic dysfunction)  MITRAL VALVE:  There was mild systolic anterior motion of the chordal structures  There was mild regurgitation      CT head: no acute intracranial abnormality    Significant Findings / Test Results:     · See above  · Potassium 3 1 on admission and 3 8 at discharge  · Trop neg x 3  Incidental Findings:   · none     Test Results Pending at Discharge (will require follow up):   · none     Outpatient Tests Requested:  · none    Complications:  none    Reason for Admission: near syncope    Hospital Course:     Arnie Baron is a 81 y o  female patient who originally presented to the hospital on 5/31/2018 due to near syncope  Patient was at a social event when she became diaphoretic, nauseated, and then vomitted followed by a near syncope event  She did not have LOC, she was aware of the entire event  She had no associated CP or SOB  No HA  There was no loss of bowel or bladder  She came to the ER and was admitted for observation and for further workup  Her BP was elevated and potasium was low on admission  Her K was repleted  Orthostatics were negative  Her BP remained elevated above goal (at time of admission SBP ranged from 214/96 to 175/79) and she was started on Norvasc  BP at time of discharge was 142/81  She felt back to baseline and wa tolerating diet, activity with no difficulty  Suspect her near syncope was secondary to HTN urgency  She was advised to check her BP daily and record and follow up with PCP  Please see above list of diagnoses and related plan for additional information  Condition at Discharge: stable     Discharge Day Visit / Exam:     Subjective:  Denies CP, SOB, HA, dizziness, N/V  Wants to go home  Vitals: Blood Pressure: 142/81 (06/01/18 1515)  Pulse: 71 (06/01/18 1515)  Temperature: 98 8 °F (37 1 °C) (06/01/18 1515)  Temp Source: Oral (06/01/18 1515)  Respirations: 16 (06/01/18 1515)  Height: 5' 4" (162 6 cm) (05/31/18 2215)  Weight - Scale: 65 8 kg (145 lb) (05/31/18 2215)  SpO2: 99 % (06/01/18 1515)  Exam:   Physical Exam   Constitutional: She is oriented to person, place, and time  She appears well-developed and well-nourished  No distress  HENT:   Head: Normocephalic and atraumatic  Mouth/Throat: Oropharynx is clear and moist    Eyes: EOM are normal  Pupils are equal, round, and reactive to light  Neck: Normal range of motion  Neck supple  Cardiovascular: Normal rate, regular rhythm and intact distal pulses  No murmur heard  Pulmonary/Chest: Effort normal and breath sounds normal  No respiratory distress  Abdominal: Soft  Bowel sounds are normal  She exhibits no distension  There is no tenderness  Musculoskeletal: Normal range of motion  She exhibits no edema  Neurological: She is alert and oriented to person, place, and time  No cranial nerve deficit  Skin: Skin is warm and dry  Psychiatric: She has a normal mood and affect  Her behavior is normal    Vitals reviewed  Discussion with Family: daughter and  at bedside    Discharge instructions/Information to patient and family:   See after visit summary for information provided to patient and family        Provisions for Follow-Up Care:  See after visit summary for information related to follow-up care and any pertinent home health orders  Disposition:     Home    For Discharges to [facility] SNF:   · Not Applicable to this Patient - Not Applicable to this Patient    Planned Readmission: not anticipated     Discharge Statement:  I spent 40 minutes discharging the patient  This time was spent on the day of discharge  I had direct contact with the patient on the day of discharge  Greater than 50% of the total time was spent examining patient, answering all patient questions, arranging and discussing plan of care with patient as well as directly providing post-discharge instructions  Additional time then spent on discharge activities  Discharge Medications:  See after visit summary for reconciled discharge medications provided to patient and family        ** Please Note: This note has been constructed using a voice recognition system **